# Patient Record
Sex: MALE | Race: WHITE | NOT HISPANIC OR LATINO | Employment: OTHER | URBAN - METROPOLITAN AREA
[De-identification: names, ages, dates, MRNs, and addresses within clinical notes are randomized per-mention and may not be internally consistent; named-entity substitution may affect disease eponyms.]

---

## 2017-01-17 ENCOUNTER — ALLSCRIPTS OFFICE VISIT (OUTPATIENT)
Dept: OTHER | Facility: OTHER | Age: 62
End: 2017-01-17

## 2017-01-23 ENCOUNTER — TRANSCRIBE ORDERS (OUTPATIENT)
Dept: LAB | Facility: CLINIC | Age: 62
End: 2017-01-23

## 2017-01-23 ENCOUNTER — APPOINTMENT (OUTPATIENT)
Dept: LAB | Facility: CLINIC | Age: 62
End: 2017-01-23
Payer: MEDICARE

## 2017-01-23 DIAGNOSIS — E11.65 TYPE 2 DIABETES MELLITUS WITH HYPERGLYCEMIA (HCC): ICD-10-CM

## 2017-01-23 DIAGNOSIS — N52.9 MALE ERECTILE DYSFUNCTION: ICD-10-CM

## 2017-01-23 LAB
ALBUMIN SERPL BCP-MCNC: 3.8 G/DL (ref 3.5–5)
ALP SERPL-CCNC: 47 U/L (ref 46–116)
ALT SERPL W P-5'-P-CCNC: 26 U/L (ref 12–78)
ANION GAP SERPL CALCULATED.3IONS-SCNC: 4 MMOL/L (ref 4–13)
AST SERPL W P-5'-P-CCNC: 20 U/L (ref 5–45)
BILIRUB SERPL-MCNC: 0.58 MG/DL (ref 0.2–1)
BUN SERPL-MCNC: 24 MG/DL (ref 5–25)
CALCIUM SERPL-MCNC: 9.2 MG/DL (ref 8.3–10.1)
CHLORIDE SERPL-SCNC: 103 MMOL/L (ref 100–108)
CHOLEST SERPL-MCNC: 272 MG/DL (ref 50–200)
CO2 SERPL-SCNC: 30 MMOL/L (ref 21–32)
CREAT SERPL-MCNC: 1.45 MG/DL (ref 0.6–1.3)
EST. AVERAGE GLUCOSE BLD GHB EST-MCNC: 128 MG/DL
FSH SERPL-ACNC: 5.1 MIU/ML (ref 0.7–10.8)
GFR SERPL CREATININE-BSD FRML MDRD: 49.6 ML/MIN/1.73SQ M
GLUCOSE SERPL-MCNC: 125 MG/DL (ref 65–140)
HBA1C MFR BLD: 6.1 % (ref 4.2–6.3)
HDLC SERPL-MCNC: 50 MG/DL (ref 40–60)
LDLC SERPL CALC-MCNC: 189 MG/DL (ref 0–100)
LH SERPL-ACNC: 4.5 MIU/ML (ref 1.2–10.6)
POTASSIUM SERPL-SCNC: 4.5 MMOL/L (ref 3.5–5.3)
PROLACTIN SERPL-MCNC: 5.7 NG/ML (ref 2.5–17.4)
PROT SERPL-MCNC: 7.3 G/DL (ref 6.4–8.2)
SODIUM SERPL-SCNC: 137 MMOL/L (ref 136–145)
TRIGL SERPL-MCNC: 166 MG/DL

## 2017-01-23 PROCEDURE — 83002 ASSAY OF GONADOTROPIN (LH): CPT

## 2017-01-23 PROCEDURE — 83001 ASSAY OF GONADOTROPIN (FSH): CPT

## 2017-01-23 PROCEDURE — 84402 ASSAY OF FREE TESTOSTERONE: CPT

## 2017-01-23 PROCEDURE — 83036 HEMOGLOBIN GLYCOSYLATED A1C: CPT

## 2017-01-23 PROCEDURE — 84403 ASSAY OF TOTAL TESTOSTERONE: CPT

## 2017-01-23 PROCEDURE — 36415 COLL VENOUS BLD VENIPUNCTURE: CPT

## 2017-01-23 PROCEDURE — 80053 COMPREHEN METABOLIC PANEL: CPT

## 2017-01-23 PROCEDURE — 80061 LIPID PANEL: CPT

## 2017-01-23 PROCEDURE — 84146 ASSAY OF PROLACTIN: CPT

## 2017-01-24 LAB
TESTOST FREE SERPL-MCNC: 4.7 PG/ML (ref 6.6–18.1)
TESTOST SERPL-MCNC: 267 NG/DL (ref 348–1197)
TESTOSTERONE COMMENT: ABNORMAL

## 2017-01-25 ENCOUNTER — GENERIC CONVERSION - ENCOUNTER (OUTPATIENT)
Dept: OTHER | Facility: OTHER | Age: 62
End: 2017-01-25

## 2017-01-26 ENCOUNTER — ALLSCRIPTS OFFICE VISIT (OUTPATIENT)
Dept: OTHER | Facility: OTHER | Age: 62
End: 2017-01-26

## 2017-02-23 DIAGNOSIS — N52.9 MALE ERECTILE DYSFUNCTION: ICD-10-CM

## 2017-06-10 ENCOUNTER — ALLSCRIPTS OFFICE VISIT (OUTPATIENT)
Dept: OTHER | Facility: OTHER | Age: 62
End: 2017-06-10

## 2017-06-10 ENCOUNTER — APPOINTMENT (OUTPATIENT)
Dept: LAB | Facility: CLINIC | Age: 62
End: 2017-06-10
Payer: MEDICARE

## 2017-06-10 DIAGNOSIS — D69.3 IMMUNE THROMBOCYTOPENIC PURPURA (HCC): ICD-10-CM

## 2017-06-10 DIAGNOSIS — R07.89 OTHER CHEST PAIN: ICD-10-CM

## 2017-06-10 DIAGNOSIS — I48.91 ATRIAL FIBRILLATION (HCC): ICD-10-CM

## 2017-06-10 LAB
ALBUMIN SERPL BCP-MCNC: 3.7 G/DL (ref 3.5–5)
ALP SERPL-CCNC: 53 U/L (ref 46–116)
ALT SERPL W P-5'-P-CCNC: 31 U/L (ref 12–78)
ANION GAP SERPL CALCULATED.3IONS-SCNC: 8 MMOL/L (ref 4–13)
AST SERPL W P-5'-P-CCNC: 20 U/L (ref 5–45)
BACTERIA UR QL AUTO: NORMAL /HPF
BASOPHILS # BLD AUTO: 0.02 THOUSANDS/ΜL (ref 0–0.1)
BASOPHILS NFR BLD AUTO: 0 % (ref 0–1)
BILIRUB SERPL-MCNC: 0.51 MG/DL (ref 0.2–1)
BILIRUB UR QL STRIP: NEGATIVE
BUN SERPL-MCNC: 25 MG/DL (ref 5–25)
CALCIUM SERPL-MCNC: 9.2 MG/DL (ref 8.3–10.1)
CHLORIDE SERPL-SCNC: 104 MMOL/L (ref 100–108)
CHOLEST SERPL-MCNC: 251 MG/DL (ref 50–200)
CK SERPL-CCNC: 160 U/L (ref 39–308)
CLARITY UR: CLEAR
CO2 SERPL-SCNC: 28 MMOL/L (ref 21–32)
COLOR UR: YELLOW
CREAT SERPL-MCNC: 1.4 MG/DL (ref 0.6–1.3)
DEPRECATED D DIMER PPP: 692 NG/ML (FEU) (ref 0–424)
EOSINOPHIL # BLD AUTO: 0.33 THOUSAND/ΜL (ref 0–0.61)
EOSINOPHIL NFR BLD AUTO: 5 % (ref 0–6)
ERYTHROCYTE [DISTWIDTH] IN BLOOD BY AUTOMATED COUNT: 14.2 % (ref 11.6–15.1)
GFR SERPL CREATININE-BSD FRML MDRD: 51.7 ML/MIN/1.73SQ M
GLUCOSE P FAST SERPL-MCNC: 125 MG/DL (ref 65–99)
GLUCOSE UR STRIP-MCNC: NEGATIVE MG/DL
HCT VFR BLD AUTO: 44.4 % (ref 36.5–49.3)
HDLC SERPL-MCNC: 45 MG/DL (ref 40–60)
HGB BLD-MCNC: 15.5 G/DL (ref 12–17)
HGB UR QL STRIP.AUTO: ABNORMAL
HYALINE CASTS #/AREA URNS LPF: NORMAL /LPF
KETONES UR STRIP-MCNC: NEGATIVE MG/DL
LDLC SERPL CALC-MCNC: 182 MG/DL (ref 0–100)
LEUKOCYTE ESTERASE UR QL STRIP: NEGATIVE
LYMPHOCYTES # BLD AUTO: 1.27 THOUSANDS/ΜL (ref 0.6–4.47)
LYMPHOCYTES NFR BLD AUTO: 19 % (ref 14–44)
MCH RBC QN AUTO: 31.1 PG (ref 26.8–34.3)
MCHC RBC AUTO-ENTMCNC: 34.9 G/DL (ref 31.4–37.4)
MCV RBC AUTO: 89 FL (ref 82–98)
MONOCYTES # BLD AUTO: 0.62 THOUSAND/ΜL (ref 0.17–1.22)
MONOCYTES NFR BLD AUTO: 9 % (ref 4–12)
NEUTROPHILS # BLD AUTO: 4.33 THOUSANDS/ΜL (ref 1.85–7.62)
NEUTS SEG NFR BLD AUTO: 67 % (ref 43–75)
NITRITE UR QL STRIP: NEGATIVE
NON-SQ EPI CELLS URNS QL MICRO: NORMAL /HPF
NRBC BLD AUTO-RTO: 0 /100 WBCS
PH UR STRIP.AUTO: 6 [PH] (ref 4.5–8)
PLATELET # BLD AUTO: 49 THOUSANDS/UL (ref 149–390)
PMV BLD AUTO: 10.7 FL (ref 8.9–12.7)
POTASSIUM SERPL-SCNC: 4 MMOL/L (ref 3.5–5.3)
PROT SERPL-MCNC: 7.4 G/DL (ref 6.4–8.2)
PROT UR STRIP-MCNC: ABNORMAL MG/DL
RBC # BLD AUTO: 4.99 MILLION/UL (ref 3.88–5.62)
RBC #/AREA URNS AUTO: NORMAL /HPF
SODIUM SERPL-SCNC: 140 MMOL/L (ref 136–145)
SP GR UR STRIP.AUTO: 1.02 (ref 1–1.03)
TRIGL SERPL-MCNC: 118 MG/DL
TROPONIN I SERPL-MCNC: 0.26 NG/ML
TSH SERPL DL<=0.05 MIU/L-ACNC: 2.68 UIU/ML (ref 0.36–3.74)
UROBILINOGEN UR QL STRIP.AUTO: 0.2 E.U./DL
WBC # BLD AUTO: 6.58 THOUSAND/UL (ref 4.31–10.16)
WBC #/AREA URNS AUTO: NORMAL /HPF

## 2017-06-10 PROCEDURE — 80061 LIPID PANEL: CPT

## 2017-06-10 PROCEDURE — 85025 COMPLETE CBC W/AUTO DIFF WBC: CPT

## 2017-06-10 PROCEDURE — 81001 URINALYSIS AUTO W/SCOPE: CPT

## 2017-06-10 PROCEDURE — 84484 ASSAY OF TROPONIN QUANT: CPT

## 2017-06-10 PROCEDURE — 84443 ASSAY THYROID STIM HORMONE: CPT

## 2017-06-10 PROCEDURE — 82550 ASSAY OF CK (CPK): CPT

## 2017-06-10 PROCEDURE — 80053 COMPREHEN METABOLIC PANEL: CPT

## 2017-06-10 PROCEDURE — 36415 COLL VENOUS BLD VENIPUNCTURE: CPT

## 2017-06-10 PROCEDURE — 85379 FIBRIN DEGRADATION QUANT: CPT

## 2017-07-13 DIAGNOSIS — D69.3 IMMUNE THROMBOCYTOPENIC PURPURA (HCC): ICD-10-CM

## 2017-09-11 DIAGNOSIS — D69.3 IMMUNE THROMBOCYTOPENIC PURPURA (HCC): ICD-10-CM

## 2017-11-10 DIAGNOSIS — D69.3 IMMUNE THROMBOCYTOPENIC PURPURA (HCC): ICD-10-CM

## 2018-01-10 NOTE — PROGRESS NOTES
Assessment    1  Asthmatic bronchitis (493 90) (J45 909)    Plan  Asthmatic bronchitis    · Azithromycin 250 MG Oral Tablet; TAKE 2 TABLETS ON DAY 1 THEN TAKE 1 TABLET A DAY  FOR 4 DAYS   · PredniSONE 10 MG Oral Tablet; TAKE 4 TABLETS DAILY FOR 2 DAYS,3 TABLETS DAILY FOR  2 DAYS, 2 TABLETS DAILY FOR 2 DAYS AND 1 TABLET DAILY FOR 2 DAYS, THEN STOP   · Promethazine-Codeine 6 25-10 MG/5ML Oral Syrup; TAKE 5 ML EVERY 4 HOURS AS NEEDED   · Proventil  (90 Base) MCG/ACT Inhalation Aerosol Solution; INHALE 2 PUFFS FOUR  TIMES DAILY AS NEEDED  Hyperlipidemia    · (1) CK (CPK); Status:Active; Requested HRY:[id number];    · (1) LIPASE; Status:Active; Requested [id number];   Hyperlipidemia, Hypertension, Type 2 diabetes mellitus with hyperglycemia    · (1) COMPREHENSIVE METABOLIC PANEL; Status:Active; Requested [id number]:[id number];   Hypertension    · (1) CBC/PLT/DIFF; Status:Active; Requested XTC:[id number];    · (1) TSH; Status:Active; Requested HFP:[id number];   Type 2 diabetes mellitus with hyperglycemia    · (1) HEMOGLOBIN A1C; Status:Active; Requested VSC:[id number];    · (1) MICROALBUMIN CREATININE RATIO, RANDOM URINE; Status:Active; Requested EAGLE:[id number];     Discussion/Summary  Discussion Summary:   1) asthmatic bronchitis start zpak flonase and zyrtec otc  Steroids for wheezes and mdi prn and prmethizine w codeine for cough  2) dm he was supposed to get labs but hasn't I have redated them and he will follow up in 4 wks    3) htn well controlled check bmp  Counseling Documentation With Imm: The patient was counseled regarding diagnostic results, instructions for management  Chief Complaint  Chief Complaint Free Text Note Form: uri      History of Present Illness  HPI: Patient presents with a 4 day hx of upper respiratory congestion without fever, chills sweat  He has cough which is non productive  He hasn't taken anything for this OTC        Review of Systems  Complete-Male:   Constitutional: No fever or chills, feels well, no tiredness, no recent weight gain or weight loss  Eyes: No complaints of eye pain, no red eyes, no discharge from eyes, no itchy eyes  ENT: as noted in HPI  Cardiovascular: No complaints of slow heart rate, no fast heart rate, no chest pain, no palpitations, no leg claudication, no lower extremity  Respiratory: No complaints of shortness of breath, no wheezing, no cough, no SOB on exertion, no orthopnea or PND  Gastrointestinal: No complaints of abdominal pain, no constipation, no nausea or vomiting, no diarrhea or bloody stools  Genitourinary: No complaints of dysuria, no incontinence, no hesitancy, no nocturia, no genital lesion, no testicular pain  Musculoskeletal: No complaints of arthralgia, no myalgias, no joint swelling or stiffness, no limb pain or swelling  Integumentary: No complaints of skin rash or skin lesions, no itching, no skin wound, no dry skin  Neurological: No compliants of headache, no confusion, no convulsions, no numbness or tingling, no dizziness or fainting, no limb weakness, no difficulty walking  Psychiatric: Is not suicidal, no sleep disturbances, no anxiety or depression, no change in personality, no emotional problems  Endocrine: No complaints of proptosis, no hot flashes, no muscle weakness, no erectile dysfunction, no deepening of the voice, no feelings of weakness  Hematologic/Lymphatic: No complaints of swollen glands, no swollen glands in the neck, does not bleed easily, no easy bruising  Active Problems    1  Anxiety disorder (300 00) (F41 9)   2  Asthmatic bronchitis (493 90) (J45 909)   3  Atherosclerotic heart disease of native coronary artery without angina pectoris (414 01) (I25 10)   4  Atrial fibrillation (427 31) (I48 91)   5  Cholecystitis (575 10) (K81 9)   6  Chronic kidney disease (CKD), stage II (mild) (585 2) (N18 2)   7  Controlled diabetes mellitus (250 00) (E11 9)   8  COPD exacerbation (491 21) (J44 1)   9  Extrinsic asthma (493 00) (J45 909)   10  Fatigue (780 79) (R53 83)   11  Fever, unspecified fever cause (780 60) (R50 9)   12  Hyperlipidemia (272 4) (E78 5)   13  Hypertension (401 9) (I10)   14  Irritable bowel syndrome (564 1) (K58 9)   15  Morbid or severe obesity due to excess calories (278 01) (E66 01)   16  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)   17  Pain in a tooth or teeth (525 9) (K08 8)   18  Pain of multiple sites (780 96) (R52)   19  Tension type headache (339 10) (G44 209)   20  Type 2 diabetes mellitus with hyperglycemia (250 00) (E11 65)    Past Medical History    1  History of Arthralgia Of The Knee / Patella / Tibia / Fibula (719 46)   2  History of Ganglion (727 43) (M67 40)   3  History of Gastrointestinal symptoms (787 99) (R19 8)   4  History of dizziness (V13 89) (Z87 898)   5  History of thrombocytopenia (V12 3) (Z86 2)   6  History of Immune thrombocytopenic purpura (287 31) (D69 3)   7  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)   8  History of Proteinuria (791 0) (R80 9)  Active Problems And Past Medical History Reviewed: The active problems and past medical history were reviewed and updated today  Surgical History    1  History of Knee Arthroscopy   2  History of Nephrectomy  Surgical History Reviewed: The surgical history was reviewed and updated today  Family History    1  Family history of Lung Cancer (V16 1)    2  Family history of Malignant Neoplasm Of The Prostate Gland (V16 42)    3  Family history of Brain Cancer (V16 8)    4  Family history of Stroke Syndrome (V17 1)  Family History Reviewed: The family history was reviewed and updated today  Social History    · Denied: History of Alcohol Use (History)   · Cigars (___ A Day) (305 1)   · Current Every Day Smoker (305 1)  Social History Reviewed: The social history was reviewed and updated today  Current Meds   1   Azithromycin 250 MG Oral Tablet; TAKE 2 TABLETS ON DAY 1 THEN TAKE 1 TABLET A DAY FOR 4   DAYS; Therapy: 07AHE5474 to (Last Rx:07Oct2015)  Requested for: 07Oct2015 Ordered   2  Cialis 10 MG Oral Tablet; 1 po QD PRN; Therapy: 95LEZ5612 to Recorded   3  ClonazePAM 0 5 MG Oral Tablet; TAKE 1 TABLET TWICE DAILY; Therapy: 26PFC2409 to Recorded   4  Dicyclomine HCl - 20 MG Oral Tablet; Therapy: 66VPG1550 to (Evaluate:71Qrh5001) Recorded   5  FreeStyle Test In Vitro Strip; TEST AS DIRECTED 3 TIMES DAILY; Therapy: 45XSF1669 to (Madhuri Zuniga)  Requested for: 31LRX4194; Last Rx:18Feb2015   Ordered   6  GaviLyte-N with Flavor Pack 420 GM Oral Solution Reconstituted; Therapy: 34OYJ2784 to (Evaluate:46Dpl3522) Recorded   7  Glimepiride 2 MG Oral Tablet; take 1/2 tablet twice a day; Therapy: 42ISI5899 to Recorded   8  Hydrocodone-Acetaminophen 5-325 MG Oral Tablet; TAKE 1 TO 2 TABLETS EVERY 4 TO 6 HOURS   AS NEEDED FOR PAIN;   Therapy: 33MCR5586 to (Last Rx:30Jun2015) Ordered   9  Hydrocodone-Acetaminophen 5-325 MG Oral Tablet; Therapy: 03UOY6068 to (Evaluate:18Soc5499) Recorded   10  Hydrocodone-Acetaminophen 7 5-300 MG Oral Tablet; Therapy: 78BPW7301 to (Evaluate:66Goc3571) Recorded   11  Meloxicam 15 MG Oral Tablet; take 1 tablet by mouth daily if needed; Therapy: 27XOJ3151 to (Adrian Rodriguez)  Requested for: 65UJY4071; Last Rx:10Nov2015    Ordered   12  MetroNIDAZOLE 500 MG Oral Tablet; Therapy: 19UTR7583 to (Evaluate:98Sno7525) Recorded   13  Pantoprazole Sodium 40 MG Oral Tablet Delayed Release; Therapy: 37MBX3278 to (Evaluate:34Euo3412) Recorded   14  Promethazine-Codeine 6 25-10 MG/5ML Oral Syrup; TAKE 5 ML EVERY 4 HOURS AS NEEDED; Therapy: 09XFM0182 to (Evaluate:23Oct2015); Last Rx:07Oct2015 Ordered   15  TiZANidine HCl - 4 MG Oral Tablet; take 1 to 2 tablets by mouth three times a day if needed; Therapy: 12DVK8645 to (Evaluate:10Aug2015)  Requested for: 25DQH9688; Last Rx:88Lwf3366    Ordered   16   Ventolin  (90 Base) MCG/ACT Inhalation Aerosol Solution; INHALE 1 TO 2 PUFFS EVERY 6    HOURS AS NEEDED; Therapy: 25LMK0333 to (Last Rx:07Oct2015)  Requested for: 07Oct2015 Ordered  Medication List Reviewed: The medication list was reviewed and updated today  Allergies    1  No Known Drug Allergies    Vitals  Vital Signs [Data Includes: Current Encounter]    Recorded: 20WMY8615 05:12PM Recorded: 35TNF9748 05:03PM   Heart Rate  85   Systolic 717 894, LUE, Sitting   Diastolic 78 85, LUE, Sitting   BP Cuff Size  Large   Height  5 ft 5 in   Weight  247 lb    BMI Calculated  41 1   BSA Calculated  2 16     Physical Exam    Constitutional   General appearance: No acute distress, well appearing and well nourished  Eyes   Conjunctiva and lids: No swelling, erythema, or discharge  Pupils and irises: Equal, round and reactive to light  Ears, Nose, Mouth, and Throat   External inspection of ears and nose: Normal     Otoscopic examination: Tympanic membrance translucent with normal light reflex  Canals patent without erythema  Nasal mucosa, septum, and turbinates: Normal without edema or erythema  Oropharynx: Normal with no erythema, edema, exudate or lesions  Pulmonary   Respiratory effort: No increased work of breathing or signs of respiratory distress  Auscultation of lungs: Abnormal   bilateral inspiratory and expiratory wheezing  Cardiovascular   Palpation of heart: Normal PMI, no thrills  Auscultation of heart: Normal rate and rhythm, normal S1 and S2, without murmurs  Examination of extremities for edema and/or varicosities: Normal     Carotid pulses: Normal     Abdomen   Abdomen: Non-tender, no masses  Liver and spleen: No hepatomegaly or splenomegaly  Lymphatic   Palpation of lymph nodes in neck: No lymphadenopathy  Musculoskeletal   Gait and station: Normal     Digits and nails: Normal without clubbing or cyanosis      Inspection/palpation of joints, bones, and muscles: Normal     Skin   Skin and subcutaneous tissue: Normal without rashes or lesions  Neurologic   Cranial nerves: Cranial nerves 2-12 intact  Reflexes: 2+ and symmetric  Sensation: No sensory loss  Psychiatric   Orientation to person, place and time: Normal     Mood and affect: Normal          Health Management  Health Maintenance   COLONOSCOPY; every 3 years; Next Due: 66Ndj7148;  Overdue    Signatures   Electronically signed by : WHITNEY Cruz ; Jan 27 2016  5:13PM EST                       (Author)

## 2018-01-12 VITALS
HEART RATE: 72 BPM | HEIGHT: 65 IN | SYSTOLIC BLOOD PRESSURE: 140 MMHG | TEMPERATURE: 98.3 F | DIASTOLIC BLOOD PRESSURE: 80 MMHG

## 2018-01-12 NOTE — RESULT NOTES
Verified Results  (1) TESTOSTERONE, FREE (DIRECT) AND TOTAL 83FUC8707 12:48PM Regis JUNE Order Number: [id number]     Test Name Result Flag Reference   FREE TESTOSTERONE, DIRECT 4 7 pg/mL L 6 6 - 18 1   COMMENT Comment     Adult male reference interval is based on a population of lean males  up to 36years old     TESTOSTERONE (TOTAL) 267 ng/dL L 348 - 1197   Performed at:  [address]  [id number]  : Catherine Forrester MD, Phone:  [phone]

## 2018-01-13 VITALS
BODY MASS INDEX: 40.98 KG/M2 | DIASTOLIC BLOOD PRESSURE: 80 MMHG | HEIGHT: 65 IN | WEIGHT: 246 LBS | SYSTOLIC BLOOD PRESSURE: 138 MMHG | HEART RATE: 62 BPM

## 2018-01-13 VITALS
DIASTOLIC BLOOD PRESSURE: 70 MMHG | SYSTOLIC BLOOD PRESSURE: 136 MMHG | WEIGHT: 245 LBS | BODY MASS INDEX: 40.77 KG/M2 | TEMPERATURE: 97.8 F | HEART RATE: 64 BPM | OXYGEN SATURATION: 97 %

## 2018-01-15 NOTE — RESULT NOTES
PFT Results v2:   Diagnosis/Reason For Study: COPD   Referring Provider: Dr Mary Perdomo   Spirometry: Forced vital capacity: 2 50L and 70% Predicted Values  Forced expiratory volume in one second: 1 97L and 73% Predicted Value  FEV1/FVC ratio is 103% Predicted Values  Post Bronchodilator Spirometry: Forced vital capacity : 1 24L and 35% Predicted Values  Forced expiratory volume in one second : 1 07L and 39% Predicted Value  FEV1/FVC ratio is 113% Predicted Values  Lung Volumes: Total lung capacity : 3 22L and 59% Predicted Values  RV: 78% Predicted Values  RV/T% Predicted Values  DLCO:   DLCO 70% Predicted Values  DLCO corrected for volume is 151%     PFT Interpretation:   Patient had a full lung function testing with spirometry lung volumes and DLCO  Patient gave a good foot  Results meet the ATS standards for acceptability and repeat ability  There is a restrictive pattern of the flow volume curve  There is moderate to severe restrictive ventilatory limitation with decreased FVC and FEV1 and a normal FEV1 over FVC ratio and decreased lung volumes  The total lung capacity is moderately decreased  The DLCO corrected for volume is normal   Recommend high-resolution CT of the chest for further evaluation for the restrictive ventilatory limitation  Clinical correlation is required  Future Appointments    Date/Time Provider Specialty Site   2016 09:00 AM WHITNEY Barrios   Pulmonary [facility]  2  CT      Electronically signed by : WHITNEY Badillo ; Mar 25 2016  6:33PM EST                       (Author)